# Patient Record
Sex: FEMALE | Race: WHITE | NOT HISPANIC OR LATINO | Employment: FULL TIME | URBAN - METROPOLITAN AREA
[De-identification: names, ages, dates, MRNs, and addresses within clinical notes are randomized per-mention and may not be internally consistent; named-entity substitution may affect disease eponyms.]

---

## 2022-08-24 RX ORDER — HYDROXYZINE HYDROCHLORIDE 10 MG/1
10 TABLET, FILM COATED ORAL NIGHTLY PRN
COMMUNITY

## 2022-08-24 RX ORDER — NORGESTIMATE AND ETHINYL ESTRADIOL 0.25-0.035
1 KIT ORAL DAILY
COMMUNITY

## 2022-08-24 RX ORDER — CHLORAL HYDRATE 500 MG
CAPSULE ORAL
COMMUNITY

## 2022-08-24 RX ORDER — ESCITALOPRAM OXALATE 10 MG/1
10 TABLET ORAL DAILY
COMMUNITY

## 2022-08-24 RX ORDER — HYDROCHLOROTHIAZIDE 25 MG/1
50 TABLET ORAL DAILY
COMMUNITY
End: 2022-10-05 | Stop reason: DRUGHIGH

## 2022-08-24 RX ORDER — BUSPIRONE HYDROCHLORIDE 15 MG/1
7.5 TABLET ORAL 2 TIMES DAILY
COMMUNITY

## 2022-08-25 ENCOUNTER — OFFICE VISIT (OUTPATIENT)
Dept: BEHAVIORAL HEALTH | Facility: CLINIC | Age: 27
End: 2022-08-25

## 2022-08-25 ENCOUNTER — OFFICE VISIT (OUTPATIENT)
Dept: BARIATRICS/WEIGHT MGMT | Facility: CLINIC | Age: 27
End: 2022-08-25

## 2022-08-25 VITALS
HEART RATE: 93 BPM | BODY MASS INDEX: 39.27 KG/M2 | SYSTOLIC BLOOD PRESSURE: 136 MMHG | DIASTOLIC BLOOD PRESSURE: 84 MMHG | OXYGEN SATURATION: 97 % | WEIGHT: 200 LBS | TEMPERATURE: 97.7 F | RESPIRATION RATE: 18 BRPM | HEIGHT: 60 IN

## 2022-08-25 DIAGNOSIS — R06.09 DYSPNEA ON EXERTION: Primary | ICD-10-CM

## 2022-08-25 DIAGNOSIS — F41.9 ANXIETY: ICD-10-CM

## 2022-08-25 DIAGNOSIS — Z71.89 ENCOUNTER FOR PSYCHOLOGICAL ASSESSMENT PRIOR TO BARIATRIC SURGERY: Primary | ICD-10-CM

## 2022-08-25 DIAGNOSIS — E16.2 HYPOGLYCEMIA: ICD-10-CM

## 2022-08-25 DIAGNOSIS — F32.A ANXIETY AND DEPRESSION: ICD-10-CM

## 2022-08-25 DIAGNOSIS — F41.0 PANIC ATTACKS: ICD-10-CM

## 2022-08-25 DIAGNOSIS — F41.9 ANXIETY AND DEPRESSION: ICD-10-CM

## 2022-08-25 DIAGNOSIS — M19.90 ARTHRITIS: ICD-10-CM

## 2022-08-25 DIAGNOSIS — E66.9 OBESITY, CLASS II, BMI 35-39.9: ICD-10-CM

## 2022-08-25 DIAGNOSIS — F32.A DEPRESSION, UNSPECIFIED DEPRESSION TYPE: ICD-10-CM

## 2022-08-25 DIAGNOSIS — R60.0 LOWER EXTREMITY EDEMA: ICD-10-CM

## 2022-08-25 DIAGNOSIS — E78.5 HYPERLIPIDEMIA, UNSPECIFIED HYPERLIPIDEMIA TYPE: ICD-10-CM

## 2022-08-25 DIAGNOSIS — I10 HYPERTENSION, UNSPECIFIED TYPE: ICD-10-CM

## 2022-08-25 DIAGNOSIS — G43.909 MIGRAINE WITHOUT STATUS MIGRAINOSUS, NOT INTRACTABLE, UNSPECIFIED MIGRAINE TYPE: ICD-10-CM

## 2022-08-25 DIAGNOSIS — R12 HEARTBURN: ICD-10-CM

## 2022-08-25 DIAGNOSIS — E66.9 OBESITY (BMI 30-39.9): ICD-10-CM

## 2022-08-25 PROCEDURE — 90791 PSYCH DIAGNOSTIC EVALUATION: CPT | Performed by: PSYCHOLOGIST

## 2022-08-25 PROCEDURE — 99204 OFFICE O/P NEW MOD 45 MIN: CPT | Performed by: PHYSICIAN ASSISTANT

## 2022-08-25 NOTE — PROGRESS NOTES
[facility] BARIATRIC SURGERY  [address]  [address]  Formerly [facility] [id number] [phone]      Patient  Name:  Sujatha Irvin  :  1995        Chief Complaint:  weight gain; unable to maintain weight loss    History of Present Illness:  Sujatha Irvin is a 27 y.o. female who presents today for evaluation, education and consultation regarding bariatric and metabolic surgery. The patient is interested in sleeve gastrectomy with Dr. Butt.     Sujatha has been overweight for at least 11 years, has been 35 pounds or more overweight for at least 11 years, has been 100 pounds or more overweight for 7 or more years and started dieting at age 27.      Previous diet attempts include: High Protein, Low Carbohydrate, Low Fat and Calorie Counting; None; None.  The most weight Sujatha lost was 0.5 pounds on watching calories but was only able to maintain that weight loss for a month.  Her maximum lifetime weight is 201 pounds.    As above, patient has been overweight for many years, with numerous failed dietary/weight loss attempts.  She now has obesity related comorbidities and as such has decided to pursue weight loss surgery.    Patient has a history of hypertension, hyperlipidemia, lower extremity edema, arthritis treated with NSAIDs as needed, anxiety, depression, migraines, episodes of hypoglycemia.    GI: Heartburn treated with Tums as needed, denies prior EGD or known past H. pylori.  Gallbladder is present denies other GI symptoms.    All other past medical, surgical, social and family history have been obtained and discussed as pertinent to bariatric surgery as below.     Pre-Procedure COVID-19 Questionnaire:    1.  Have you previously been tested for COVID-19?    []  No  [x]  Yes    2.  Were you ever positive for COVID-19?    []  No  [x]  Yes    3.  Are you employed in a healthcare setting?    [x]  No  []  Yes    4.  Are you symptomatic for COVID-19 as defined by the CDC (fever,  cough)?  If so, when did symptoms begin?    [x]  No    []  Yes, symptoms began **    5.  Have you been hospitalized for COVID-19?  If so, were you in the ICU?  [x]  No    []  Yes, but not in the ICU    []  Yes, and I was in the ICU    6.  Are you a resident in a congregate (group care setting?)    [x]  No  []  Yes    7.  Are you pregnant?  [x]  No  []  Yes    8.  Are you vaccinated?    [x]  No  []  Yes, but only partially   []  Yes, fully         Past Medical History:   Diagnosis Date   • Anxiety    • Arthritis     NSAIDS prn   • Depression    • Dyspnea on exertion    • Heartburn     tums prn,  no prior EGD or known h pylori   • HLD (hyperlipidemia)    • HTN (hypertension)    • Hypoglycemia    • Lower extremity edema    • Migraines      Past Surgical History:   Procedure Laterality Date   •  SECTION     •  SECTION     • KNEE SURGERY Right    • KNEE SURGERY Right        No Known Allergies    Current Outpatient Medications:   •  busPIRone (BUSPAR) 15 MG tablet, Take 7.5 mg by mouth 2 (Two) Times a Day., Disp: , Rfl:   •  escitalopram (LEXAPRO) 10 MG tablet, Take 10 mg by mouth Daily., Disp: , Rfl:   •  hydroCHLOROthiazide (HYDRODIURIL) 25 MG tablet, Take 50 mg by mouth Daily., Disp: , Rfl:   •  hydrOXYzine (ATARAX) 10 MG tablet, Take 10 mg by mouth Daily., Disp: , Rfl:   •  norgestimate-ethinyl estradiol (ORTHO-CYCLEN) 0.25-35 MG-MCG per tablet, Take 1 tablet by mouth Daily., Disp: , Rfl:   •  Omega-3 Fatty Acids (fish oil) 1000 MG capsule capsule, Take  by mouth Daily With Breakfast., Disp: , Rfl:     Social History     Socioeconomic History   • Marital status: Single   Tobacco Use   • Smoking status: Never Smoker   • Smokeless tobacco: Never Used   Vaping Use   • Vaping Use: Never used   Substance and Sexual Activity   • Alcohol use: Never   • Drug use: Defer     Family History   Problem Relation Age of Onset   • Hypertension Mother    • Stroke Mother 45   • Cancer Mother    • No  Known Problems Father    • Sleep apnea Brother    • Skin cancer Maternal Grandmother    • Lung cancer Maternal Grandfather    • Diabetes Paternal Grandmother    • Cancer Paternal Grandfather        Review of Systems:  Constitutional:  Reports fatigue, weight gain and denies fevers, chills.  HEENT:  denies headache, ear pain or loss of hearing, blurred or double vision, nasal discharge or sore throat.  Cardiovascular:  Reports HTN, HLD, edema and denies CAD, Atrial Fib, hx heart disease, heart murmur, hx MI, chest pain, palpitations, , hx DVT.  Respiratory:  Reports dyspnea on exertion and denies cough , wheezing, sleep apnea, asthma, COPD, hx PE.  Gastrointestinal:  Reports heartburn and denies dysphagia, nausea, vomiting, abdominal pain, IBS, diarrhea, constipation, melena, blood in stool, gallbladder issues, liver disease, hx pancreatitis.  Genitourinary:  Reports none and denies history of  frequent UTI, incontinence, hematuria, dysuria, polyuria, polydipsia, renal insufficiency, renal failure.    Musculoskeletal:  Reports knee OA and denies joint pain, fibromyalgia and autoimmune disease.  Neurological:  Reports migraines and denies numbness /tingling, dizziness, confusion, seizure, stroke.  Psychiatric:  Reports hx depression, hx anxiety and denies bipolar disorder, suicidal ideation, hx suicide attempt, hx self injury, hx substance abuse, eating disorder.  Endocrine:  Reports hypoglycemia and denies PCOS, endometriosis, glucose intolerance, diabetes, thyroid disease, gout.  Hematologic:  Reports none and denies bruising, bleeding disorder, hx anemia, hx blood transfusion.  Skin:  Reports none and denies rashes, hx MRSA.      Physical Exam:  Vital Signs:  Weight: 90.7 kg (200 lb)   Body mass index is 39.72 kg/m².  Temp: 97.7 °F (36.5 °C)   Heart Rate: 93   BP: 136/84     Physical Exam  Vitals reviewed.   Constitutional:       Appearance: She is well-developed. She is obese.   HENT:      Head: Normocephalic.       Comments: mask  Neck:      Thyroid: No thyromegaly.   Cardiovascular:      Rate and Rhythm: Normal rate and regular rhythm.      Heart sounds: Normal heart sounds.   Pulmonary:      Effort: Pulmonary effort is normal. No respiratory distress.      Breath sounds: Normal breath sounds. No wheezing.   Abdominal:      General: Bowel sounds are normal. There is no distension.      Palpations: Abdomen is soft.      Tenderness: There is no abdominal tenderness.      Comments: Low transverse scar   Musculoskeletal:         General: Normal range of motion.      Cervical back: Normal range of motion and neck supple.   Skin:     General: Skin is warm and dry.   Neurological:      Mental Status: She is alert and oriented to person, place, and time.   Psychiatric:         Mood and Affect: Mood normal.         Behavior: Behavior normal.         Thought Content: Thought content normal.         Judgment: Judgment normal.         Patient Active Problem List   Diagnosis   • Dyspnea on exertion   • HLD (hyperlipidemia)   • HTN (hypertension)   • Lower extremity edema   • Heartburn   • Arthritis   • Anxiety   • Depression   • Migraines   • Hypoglycemia       Assessment:    Sujatha Irvin is a 27 y.o. year old female with medically complicated obesity pursuing sleeve gastrectomy.    Weight loss surgery is deemed medically necessary given the following obesity related comorbidities including hypertension, dyslipidemia, osteoarthritis, knee pain, GERD, edema, depression and mental health disease with current Weight: 90.7 kg (200 lb) and Body mass index is 39.72 kg/m²..    Plan:  The consultation plan and program requirements were reviewed with the patient.  The patient has been advised that a letter of medical support must be obtained from her primary care physician or referring provider. A psychological evaluation will be arranged.  A nutritional evaluation will be performed.  The patient was advised to start a high protein and low  carbohydrate diet.  Necessary lifestyle modifications were discussed.  Instructions on how to access AMANDA was given to the patient.  AMANDA is an internet based educational video that explains the surgical procedure chosen and answers basic questions regarding that procedure.     Class 2 Severe Obesity (BMI >=35 and <=39.9). Obesity-related health conditions include the following: assa raffi. Obesity is worsening. BMI is is above average; BMI management plan is completed. We discussed consulting a Bariatric surgeon.        Preoperative testing will include: CBC, CMP, Lipids, TSH, HgA1C, H.Pylori UBT, EKG, CXR and EGD     The risks and benefits of the upper endoscopy were discussed with the patient in detail and all questions were answered.  Possibility of perforation, bleeding, aspiration, and anesthesia reaction were reviewed.  Patient agrees to proceed.      Additional preop clearances required prior to surgery: Cardiology.      The patient has been educated on expected postoperative lifestyle changes, including commitment to high protein diet, vitamin regimen, and exercise program.  They are aware that support groups are encouraged for optimal weight loss results. Patient understands that bariatric surgery is not cosmetic surgery but rather a tool to help make a lifelong commitment to lifestyle changes including diet, exercise, behavior modifications, and healthy habits. The procedure was discussed with the patient and all questions were answered. The importance of avoiding ASA/ NSAIDS/ steroids/ tobacco/ hormones/ immunomodulators perioperatively was discussed.         Cary Ferrell PA-C

## 2022-08-26 DIAGNOSIS — R53.83 FATIGUE, UNSPECIFIED TYPE: Primary | ICD-10-CM

## 2022-08-26 NOTE — PROGRESS NOTES
PROGRESS NOTE    Data:    Sujatha Irvin is a 27 y.o. female who met with the undersigned for a scheduled psychological evaluation from 1:45 - 2:30pm.      Clinical Maneuvering/Intervention:      Chief complaint and history of presenting illness/Problems: struggling with obesity for several years. Despite trying different weight loss plans and diets, the pt reported being unsuccessful in losing weight. A psychological evaluation was conducted in order to assess past and current level of functioning. Areas assessed included, but were not limited to: perception of social support, perception of ability to face and deal with challenges in life (positive functioning), anxiety symptoms, depressive symptoms, perspective on beliefs/belief system, coping skills for stress, intelligence level, addiction issues, etc. Therapeutic rapport was established. Interventions conducted today were geared towards assessing the pt's readiness for weight loss surgery and identifying and psychological contraindications for undergoing such a major life change. Social support was deemed strong (specific to weight loss surgery/weight loss in this manner and in a general sense): boyfriend, psychiatrist, emotional support dog, and friends. Current psychological struggles were described as low to moderate, but included anxiety, depression, and fear that if she does not start losing weight soon that her health will start to decline faster. She has panic attacks as well. Coping skills for distress and related to undergoing a major life change such as weight loss surgery/weight loss were deemed strong and included intelligent, good sense of humor, follows directions well, responsible person, maintains quality relationships with others, taking care of her animals at home (cats, dogs, lizard), and believes in herself that he will be successful with weight loss surgery. The pt endorsed having characteristics of readiness to undergo major life changes  inherent in the journey of weight loss surgery. She could speak to having suffered enough with failed diets and the decision to have weight loss surgery is one she feels good about. She rated herself a 10 out of 10, in terms of perceived psychological readiness for weight loss surgery (with 10 being the most ready possible). The pt expressed gratitude for today's visit.     Past Family and Social History:      History of family mental health problems: father (depression)    Psychosocial history: treatment of psychiatric care in the past (N/A), alcohol/substance abuse treatment in the past (N/A) , alcohol/substance abuse problems (N/A), inpatient psychiatric care (N/A).    Mental Status Exam (MSE):  Hygiene:  good  Dress: normal  Attitude:  cooperative and proactive  Motor Activity: normal  Speech: normal  Mood:   excited  Affect:  congruent  Thought Processes: normal  Thought Content:  normal  Suicidal Thoughts:  not endorsed  Homicidal Thoughts:  not endorsed  Crisis Safety Plan: not needed   Hallucinations:  none      Patient's Support Network Includes:  family, friends      Progress toward goal: there is evidence to suggest that she is taking measures to improve the quality of her life including seeking weight loss surgery.       Functional Status: moderate to high      Prognosis: good with weight loss surgery and psychotropic medication     Evaluation, Diagnoses, and Ability/Capacity to Respond to Treatment:      The pt presented to be struggling with anxiety, depression, panic attacks and obesity (BMI = 39.72, obese). It is likely that weight loss will help her feel less emotional distress and improve her confidence. Results of MSE demonstrated a functional status of moderate to high. Strengths: belief in self that she will be successful with weight loss surgery, etc (see detailed list of coping skills above). Needed for growth (CPT code requirement for Weaknesses): weight loss.      From a psychological  standpoint, the pt presents as a good candidate for bariatric surgery. She is motivated for the surgery, has showed readiness for the lifestyle change in terms of starting to adjust her eating habits, and seems to have appropriate expectations of how to prepare and how to live after surgery in order to lose weight successfully.    Treatment Plan:      Short term goals: Continue psychotropic medication as prescribed and regular visits with her psychiatrist (at least every 2 months). Start improving her health by following up with her bariatric surgeon in order to receive weight loss surgery as soon as feasible/appropriate and demonstrate success with compliance to adhering to the recommended diet. Long term goals: reach a healthy weight and alleviation of depression/anxiety/prevent panic attacks via taking control over her health.    Ally Alcantara, PhD, LP

## 2022-08-27 LAB
25(OH)D3+25(OH)D2 SERPL-MCNC: 17.3 NG/ML (ref 30–100)
ALBUMIN SERPL-MCNC: 4.2 G/DL (ref 3.9–5)
ALBUMIN/GLOB SERPL: 1.6 {RATIO} (ref 1.2–2.2)
ALP SERPL-CCNC: 80 IU/L (ref 44–121)
ALT SERPL-CCNC: 10 IU/L (ref 0–32)
AST SERPL-CCNC: 11 IU/L (ref 0–40)
BASOPHILS # BLD AUTO: 0 X10E3/UL (ref 0–0.2)
BASOPHILS NFR BLD AUTO: 1 %
BILIRUB SERPL-MCNC: <0.2 MG/DL (ref 0–1.2)
BUN SERPL-MCNC: 11 MG/DL (ref 6–20)
BUN/CREAT SERPL: 17 (ref 9–23)
CALCIUM SERPL-MCNC: 9.1 MG/DL (ref 8.7–10.2)
CHLORIDE SERPL-SCNC: 104 MMOL/L (ref 96–106)
CHOLEST SERPL-MCNC: 225 MG/DL (ref 100–199)
CO2 SERPL-SCNC: 22 MMOL/L (ref 20–29)
CREAT SERPL-MCNC: 0.64 MG/DL (ref 0.57–1)
EGFRCR-CYS SERPLBLD CKD-EPI 2021: 124 ML/MIN/1.73
EOSINOPHIL # BLD AUTO: 0.1 X10E3/UL (ref 0–0.4)
EOSINOPHIL NFR BLD AUTO: 1 %
ERYTHROCYTE [DISTWIDTH] IN BLOOD BY AUTOMATED COUNT: 11.7 % (ref 11.7–15.4)
FERRITIN SERPL-MCNC: 44 NG/ML (ref 15–150)
FOLATE SERPL-MCNC: 14.1 NG/ML
GLOBULIN SER CALC-MCNC: 2.7 G/DL (ref 1.5–4.5)
GLUCOSE SERPL-MCNC: 96 MG/DL (ref 65–99)
HBA1C MFR BLD: 5.2 % (ref 4.8–5.6)
HCT VFR BLD AUTO: 45.1 % (ref 34–46.6)
HDLC SERPL-MCNC: 40 MG/DL
HGB BLD-MCNC: 14.6 G/DL (ref 11.1–15.9)
IMM GRANULOCYTES # BLD AUTO: 0 X10E3/UL (ref 0–0.1)
IMM GRANULOCYTES NFR BLD AUTO: 1 %
IRON SERPL-MCNC: 79 UG/DL (ref 27–159)
LDLC SERPL CALC-MCNC: 120 MG/DL (ref 0–99)
LYMPHOCYTES # BLD AUTO: 2.1 X10E3/UL (ref 0.7–3.1)
LYMPHOCYTES NFR BLD AUTO: 24 %
MCH RBC QN AUTO: 29.3 PG (ref 26.6–33)
MCHC RBC AUTO-ENTMCNC: 32.4 G/DL (ref 31.5–35.7)
MCV RBC AUTO: 91 FL (ref 79–97)
MONOCYTES # BLD AUTO: 0.7 X10E3/UL (ref 0.1–0.9)
MONOCYTES NFR BLD AUTO: 8 %
NEUTROPHILS # BLD AUTO: 5.7 X10E3/UL (ref 1.4–7)
NEUTROPHILS NFR BLD AUTO: 65 %
PLATELET # BLD AUTO: 378 X10E3/UL (ref 150–450)
POTASSIUM SERPL-SCNC: 4.6 MMOL/L (ref 3.5–5.2)
PROT SERPL-MCNC: 6.9 G/DL (ref 6–8.5)
RBC # BLD AUTO: 4.98 X10E6/UL (ref 3.77–5.28)
SODIUM SERPL-SCNC: 140 MMOL/L (ref 134–144)
TRIGL SERPL-MCNC: 369 MG/DL (ref 0–149)
TSH SERPL DL<=0.005 MIU/L-ACNC: 3.29 UIU/ML (ref 0.45–4.5)
UREA BREATH TEST QL: NEGATIVE
VIT B12 SERPL-MCNC: 299 PG/ML (ref 232–1245)
VLDLC SERPL CALC-MCNC: 65 MG/DL (ref 5–40)
WBC # BLD AUTO: 8.7 X10E3/UL (ref 3.4–10.8)

## 2022-09-06 ENCOUNTER — DOCUMENTATION (OUTPATIENT)
Dept: BARIATRICS/WEIGHT MGMT | Facility: CLINIC | Age: 27
End: 2022-09-06

## 2022-09-06 NOTE — PROGRESS NOTES
"Weight Loss Surgery  Presurgical Nutrition Assessment     Sujatha Irvin  2022  [id number]  [id number]  1995   female    Surgery desired: Sleeve Gastrectomy    Height: 151.1 cm (59.5\")  Weight: 90.7 kg (200 #)  BMI: 39.72    Past Medical History:   Diagnosis Date   • Anxiety    • Arthritis     NSAIDS prn   • Depression    • Dyspnea on exertion    • Heartburn     tums prn,  no prior EGD or known h pylori   • HLD (hyperlipidemia)    • HTN (hypertension)    • Hypoglycemia    • Lower extremity edema    • Migraines      Past Surgical History:   Procedure Laterality Date   •  SECTION     •  SECTION     • KNEE SURGERY Right    • KNEE SURGERY Right      No Known Allergies    Current Outpatient Medications:   •  busPIRone (BUSPAR) 15 MG tablet, Take 7.5 mg by mouth 2 (Two) Times a Day., Disp: , Rfl:   •  Cholecalciferol 1.25 MG (40363 UT) tablet, Take 1 tablet by mouth Every 7 (Seven) Days for 12 doses., Disp: 12 tablet, Rfl: 0  •  escitalopram (LEXAPRO) 10 MG tablet, Take 10 mg by mouth Daily., Disp: , Rfl:   •  hydroCHLOROthiazide (HYDRODIURIL) 25 MG tablet, Take 50 mg by mouth Daily., Disp: , Rfl:   •  hydrOXYzine (ATARAX) 10 MG tablet, Take 10 mg by mouth Daily., Disp: , Rfl:   •  norgestimate-ethinyl estradiol (ORTHO-CYCLEN) 0.25-35 MG-MCG per tablet, Take 1 tablet by mouth Daily., Disp: , Rfl:   •  Omega-3 Fatty Acids (fish oil) 1000 MG capsule capsule, Take  by mouth Daily With Breakfast., Disp: , Rfl:       Assessment of Caloric needs    Estimated Current energy needs:  1555 kcal    Estimated calories for weight loss:  1200 kcal    IBW (Pounds):  125 #      Excess body weight (Pounds):  75 #       Nutrition Recall:  Example of Usual 24 hour intake:      Breakfast: only occasionally eats breakfast at an earlier hour, usually mid-morning if she does eat this meal.    Lunch: @ 11 am - 1/3 pound burger with 1/2 cup fries and 1 cup regular Sprite.     Dinner: @ 5 pm - an 8 oz " steak with 1 cup potatoes and 2 cups sweet tea.     Snacks: none recorded or stated    Beverages of Choice: regular Sprite and sweet tea    Food Allergies or Intolerances: none stated or recorded          Exercise: no set exercise or activity program at this time.        Assessment of Nutritional Adequacy, Excessive Intake or Deficiencies: Total amount of protein to meet needs approached with larger servings of meat at each of two meals.  Diet is high in simple processed carbohydrate, namely potatoes - fried and mashed. Also, due to sweet beverage intake.          Education    Provided Nutrition Guidelines for Bariatric and Metabolic Surgery   1. Reviewed guidelines for higher protein, limited carbohydrate diet to promote weight loss.  Encouraged patient to incorporate these principles of healthy eating    2. Encouraged patient to choose an acceptable protein supplement beverage for the post-surgery liquid diet.  Provided product guidelines and examples.    3. Explained importance of goal setting to help in changing eating behaviors that are not conducive to weight loss.  Specific macronutrient goals as below.   4. Provided follow-up options for support, including contact information for dietitians here.   Web-based support information and apps for smart phones and computers given.        Nutrition Goals   Protein goal:  grams per day in three regular balanced meals and two to three high protein snacks each day, to ensure desired weight loss.   Carbohydrate goal:  100-140 grams per day  Beverage goal: Appropriate non-carbonated beverage intake. Patient will wean herself off of all soda and any other sweetened beverages eg sweet tea.     Exercise Goals  1. Continue current exercise routine, adding 15-30 minutes of activity per day as tolerated and per medical advice.   2. Start activity plan per medical advice if not currently exercising.     Recommend that team proceed with surgery and follow per protocol.    Ligia Angulo, ELENA  09/06/2022  16:30 EDT

## 2022-09-07 ENCOUNTER — HOSPITAL ENCOUNTER (OUTPATIENT)
Dept: RESPIRATORY THERAPY | Facility: HOSPITAL | Age: 27
Discharge: HOME | End: 2022-09-07

## 2022-09-07 ENCOUNTER — TRANSCRIBE ORDERS (OUTPATIENT)
Dept: BARIATRICS/WEIGHT MGMT | Facility: CLINIC | Age: 27
End: 2022-09-07

## 2022-09-07 DIAGNOSIS — R53.83 FATIGUE, UNSPECIFIED TYPE: ICD-10-CM

## 2022-09-07 DIAGNOSIS — R53.83 FATIGUE, UNSPECIFIED TYPE: Primary | ICD-10-CM

## 2022-09-07 LAB
A-A DO2: 12 MMHG (ref 0–300)
ARTERIAL PATENCY WRIST A: ABNORMAL
ATMOSPHERIC PRESS: 729 MMHG
BASE EXCESS BLDA CALC-SCNC: -0.4 MMOL/L (ref 0–2)
BDY SITE: ABNORMAL
BODY TEMPERATURE: 0 C
CO2 BLDA-SCNC: 24.8 MMOL/L (ref 22–33)
COHGB MFR BLD: 1.2 % (ref 0–5)
HCO3 BLDA-SCNC: 23.7 MMOL/L (ref 20–26)
HCT VFR BLD CALC: 46.1 % (ref 38–51)
HGB BLDA-MCNC: 15.1 G/DL (ref 13.5–17.5)
INHALED O2 CONCENTRATION: 21 %
Lab: ABNORMAL
METHGB BLD QL: 0.2 % (ref 0–3)
MODALITY: ABNORMAL
NOTE: ABNORMAL
OXYHGB MFR BLDV: 96.7 % (ref 94–99)
PCO2 BLDA: 36.7 MM HG (ref 35–45)
PCO2 TEMP ADJ BLD: ABNORMAL MM[HG]
PH BLDA: 7.42 PH UNITS (ref 7.35–7.45)
PH, TEMP CORRECTED: ABNORMAL
PO2 BLDA: 89.7 MM HG (ref 83–108)
PO2 TEMP ADJ BLD: ABNORMAL MM[HG]
SAO2 % BLDCOA: 98.1 % (ref 94–99)
VENTILATOR MODE: ABNORMAL

## 2022-09-07 PROCEDURE — 94726 PLETHYSMOGRAPHY LUNG VOLUMES: CPT

## 2022-09-07 PROCEDURE — 83050 HGB METHEMOGLOBIN QUAN: CPT

## 2022-09-07 PROCEDURE — 94726 PLETHYSMOGRAPHY LUNG VOLUMES: CPT | Performed by: INTERNAL MEDICINE

## 2022-09-07 PROCEDURE — 94060 EVALUATION OF WHEEZING: CPT | Performed by: INTERNAL MEDICINE

## 2022-09-07 PROCEDURE — 82375 ASSAY CARBOXYHB QUANT: CPT

## 2022-09-07 PROCEDURE — 94640 AIRWAY INHALATION TREATMENT: CPT

## 2022-09-07 PROCEDURE — 94729 DIFFUSING CAPACITY: CPT

## 2022-09-07 PROCEDURE — 36600 WITHDRAWAL OF ARTERIAL BLOOD: CPT

## 2022-09-07 PROCEDURE — 94060 EVALUATION OF WHEEZING: CPT

## 2022-09-07 PROCEDURE — 94729 DIFFUSING CAPACITY: CPT | Performed by: INTERNAL MEDICINE

## 2022-09-07 PROCEDURE — 82805 BLOOD GASES W/O2 SATURATION: CPT

## 2022-09-07 RX ORDER — ALBUTEROL SULFATE 2.5 MG/3ML
2.5 SOLUTION RESPIRATORY (INHALATION) ONCE
Status: COMPLETED | OUTPATIENT
Start: 2022-09-07 | End: 2022-09-07

## 2022-09-07 RX ADMIN — ALBUTEROL SULFATE 2.5 MG: 2.5 SOLUTION RESPIRATORY (INHALATION) at 10:17

## 2022-09-19 ENCOUNTER — OUTSIDE FACILITY SERVICE (OUTPATIENT)
Dept: BARIATRICS/WEIGHT MGMT | Facility: CLINIC | Age: 27
End: 2022-09-19

## 2022-09-19 ENCOUNTER — LAB REQUISITION (OUTPATIENT)
Dept: LAB | Facility: HOSPITAL | Age: 27
End: 2022-09-19

## 2022-09-19 DIAGNOSIS — R12 HEARTBURN: ICD-10-CM

## 2022-09-19 PROCEDURE — 43239 EGD BIOPSY SINGLE/MULTIPLE: CPT | Performed by: SURGERY

## 2022-09-19 PROCEDURE — 88305 TISSUE EXAM BY PATHOLOGIST: CPT | Performed by: SURGERY

## 2022-09-20 LAB
CYTO UR: NORMAL
LAB AP CASE REPORT: NORMAL
LAB AP CLINICAL INFORMATION: NORMAL
PATH REPORT.FINAL DX SPEC: NORMAL
PATH REPORT.GROSS SPEC: NORMAL

## 2022-10-05 ENCOUNTER — OFFICE VISIT (OUTPATIENT)
Dept: CARDIOLOGY | Facility: CLINIC | Age: 27
End: 2022-10-05

## 2022-10-05 VITALS
WEIGHT: 204.4 LBS | HEART RATE: 83 BPM | SYSTOLIC BLOOD PRESSURE: 128 MMHG | OXYGEN SATURATION: 98 % | DIASTOLIC BLOOD PRESSURE: 90 MMHG | BODY MASS INDEX: 41.2 KG/M2 | HEIGHT: 59 IN

## 2022-10-05 DIAGNOSIS — G47.33 OSA (OBSTRUCTIVE SLEEP APNEA): ICD-10-CM

## 2022-10-05 DIAGNOSIS — R06.09 DYSPNEA ON EXERTION: ICD-10-CM

## 2022-10-05 DIAGNOSIS — E78.2 MIXED HYPERLIPIDEMIA: ICD-10-CM

## 2022-10-05 DIAGNOSIS — Z01.810 PRE-OPERATIVE CARDIOVASCULAR EXAMINATION: Primary | ICD-10-CM

## 2022-10-05 DIAGNOSIS — R60.0 LOWER EXTREMITY EDEMA: ICD-10-CM

## 2022-10-05 DIAGNOSIS — I10 HYPERTENSION, UNSPECIFIED TYPE: ICD-10-CM

## 2022-10-05 PROCEDURE — 99204 OFFICE O/P NEW MOD 45 MIN: CPT | Performed by: SPECIALIST

## 2022-10-05 PROCEDURE — 93000 ELECTROCARDIOGRAM COMPLETE: CPT | Performed by: SPECIALIST

## 2022-10-05 RX ORDER — HYDROCHLOROTHIAZIDE 50 MG/1
1 TABLET ORAL EVERY MORNING
COMMUNITY
Start: 2022-07-14

## 2022-10-05 RX ORDER — IBUPROFEN 800 MG/1
TABLET ORAL
COMMUNITY
Start: 2022-09-08

## 2022-10-05 RX ORDER — TIZANIDINE 4 MG/1
4 TABLET ORAL 3 TIMES DAILY PRN
COMMUNITY
Start: 2022-09-08

## 2022-10-05 NOTE — PROGRESS NOTES
Subjective   Initial consultation, preoperative cardiac risk assessment for bariatric surgery  Sujatha Irvin is a 27 y.o. female who presents to day for Establish Care (Here for eval. ), Hypertension, Hyperlipidemia, Shortness of Breath, and Leg Swelling.    CHIEF COMPLIANT  Chief Complaint   Patient presents with   • Establish Care     Here for eval.    • Hypertension   • Hyperlipidemia   • Shortness of Breath   • Leg Swelling     Problem List:    1. BROWN  2. HTN  3. Hyperlipidemia  4. Migraines    Problem List Items Addressed This Visit        Cardiac and Vasculature    Dyspnea on exertion    Relevant Orders    ECG 12 Lead    Adult Transthoracic Echo Complete w/ Color, Spectral and Contrast if necessary per protocol    HLD (hyperlipidemia)    HTN (hypertension)    Relevant Medications    hydroCHLOROthiazide (HYDRODIURIL) 50 MG tablet    Other Relevant Orders    ECG 12 Lead    Pre-operative cardiovascular examination - Primary       Sleep    EMILY (obstructive sleep apnea)    Relevant Orders    Home Sleep Study       Symptoms and Signs    Lower extremity edema          HPI  She is here today for preoperative risk assessment for bariatric surgery she is fairly active she said she gets along with some shortness of breath if she climbs hills or going fast with intermittent lower extremity edema but no chest pain no palpitations no dizziness she never had any syncope she never used tobacco in the past she never smoked she does have history of hypertension and she has been taking hydrochlorothiazide with improvement of her blood pressure but still not optimally controlled she also has been fatigued and she snores quite a bit at night no history of diabetes she was told in the past that she has hypoglycemia she does have hyperlipidemia and she takes fish oil for that family history wise her mother had some kind of irregular heartbeat and one of her uncles had heart                    PRIOR MEDS  Current Outpatient  Medications on File Prior to Visit   Medication Sig Dispense Refill   • busPIRone (BUSPAR) 15 MG tablet Take 7.5 mg by mouth 2 (Two) Times a Day.     • Cholecalciferol 1.25 MG (38648 UT) tablet Take 1 tablet by mouth Every 7 (Seven) Days for 12 doses. 12 tablet 0   • escitalopram (LEXAPRO) 10 MG tablet Take 10 mg by mouth Daily.     • hydroCHLOROthiazide (HYDRODIURIL) 50 MG tablet Take 1 tablet by mouth Every Morning.     • hydrOXYzine (ATARAX) 10 MG tablet Take 10 mg by mouth At Night As Needed.     • ibuprofen (ADVIL,MOTRIN) 800 MG tablet TAKE 1 TABLET BY MOUTH THREE TIMES DAILY AS NEEDED WITH FOOD OR MILK     • norgestimate-ethinyl estradiol (ORTHO-CYCLEN) 0.25-35 MG-MCG per tablet Take 1 tablet by mouth Daily.     • Omega-3 Fatty Acids (fish oil) 1000 MG capsule capsule Take  by mouth Daily With Breakfast.     • tiZANidine (ZANAFLEX) 4 MG tablet Take 4 mg by mouth 3 (Three) Times a Day As Needed.     • [DISCONTINUED] hydroCHLOROthiazide (HYDRODIURIL) 25 MG tablet Take 50 mg by mouth Daily.       No current facility-administered medications on file prior to visit.       ALLERGIES  Patient has no known allergies.    HISTORY  Past Medical History:   Diagnosis Date   • Anxiety    • Arthritis     NSAIDS prn   • Depression    • Dyspnea on exertion    • Heartburn     tums prn,  no prior EGD or known h pylori   • HLD (hyperlipidemia)    • HTN (hypertension)    • Hypoglycemia    • Lower extremity edema    • Migraines        Social History     Socioeconomic History   • Marital status: Single   Tobacco Use   • Smoking status: Never Smoker   • Smokeless tobacco: Never Used   Vaping Use   • Vaping Use: Never used   Substance and Sexual Activity   • Alcohol use: Never   • Drug use: Defer       Family History   Problem Relation Age of Onset   • Hypertension Mother    • Stroke Mother 45   • Cancer Mother    • No Known Problems Father    • Sleep apnea Brother    • Skin cancer Maternal Grandmother    • Lung cancer Maternal  "Grandfather    • Diabetes Paternal Grandmother    • Cancer Paternal Grandfather        Review of Systems   Constitutional: Positive for fatigue.   Eyes: Positive for visual disturbance.   Respiratory: Positive for shortness of breath.    Cardiovascular: Positive for leg swelling. Negative for chest pain and palpitations.   Gastrointestinal: Negative.    Endocrine: Negative.    Genitourinary: Negative.    Musculoskeletal: Positive for arthralgias and myalgias.   Skin: Negative.    Allergic/Immunologic: Negative.    Neurological: Positive for dizziness, syncope, light-headedness and headaches (HX migraines).   Hematological: Negative.    Psychiatric/Behavioral: Negative.        Objective     VITALS: /90 (BP Location: Left arm, Patient Position: Sitting)   Pulse 83   Ht 151.1 cm (59.49\")   Wt 92.7 kg (204 lb 6.4 oz)   SpO2 98%   BMI 40.61 kg/m²     LABS:   Lab Results (most recent)     None          IMAGING:   No Images in the past 120 days found..    EXAM:  Physical Exam  Vitals reviewed.   Constitutional:       Appearance: She is well-developed.   HENT:      Head: Normocephalic and atraumatic.   Eyes:      Pupils: Pupils are equal, round, and reactive to light.   Neck:      Thyroid: No thyromegaly.      Vascular: No JVD.   Cardiovascular:      Rate and Rhythm: Normal rate and regular rhythm.      Heart sounds: Normal heart sounds. No murmur heard.    No friction rub. No gallop.   Pulmonary:      Effort: Pulmonary effort is normal. No respiratory distress.      Breath sounds: Normal breath sounds. No stridor. No wheezing or rales.   Chest:      Chest wall: No tenderness.   Musculoskeletal:         General: No tenderness or deformity.      Cervical back: Neck supple.   Skin:     General: Skin is warm and dry.   Neurological:      Mental Status: She is alert and oriented to person, place, and time.      Cranial Nerves: No cranial nerve deficit.      Coordination: Coordination normal.         Procedure     ECG " 12 Lead    Date/Time: 10/5/2022 3:52 PM  Performed by: Chuck Brady MD  Authorized by: Chuck Brady MD   Comparison: not compared with previous ECG   Previous ECG: no previous ECG available          EKG: Normal sinus rhythm with sinus arrhythmia otherwise unremarkable EKG, no previous EKGs available for comparison       Assessment & Plan     Diagnoses and all orders for this visit:    1. Pre-operative cardiovascular examination (Primary)    2. Dyspnea on exertion  -     ECG 12 Lead  -     Adult Transthoracic Echo Complete w/ Color, Spectral and Contrast if necessary per protocol; Future    3. Mixed hyperlipidemia    4. Hypertension, unspecified type  -     ECG 12 Lead    5. Lower extremity edema    6. EMILY (obstructive sleep apnea)  -     Home Sleep Study; Future    1.  She is having some occasional edema and shortness of breath some going to get an echocardiogram to assess her cardiac function wall motion and valve morphology  2.  Her blood pressure is mildly elevated with diastolic measurements will monitor for now  3.  I reviewed her labs with significantly elevated triglycerides mild elevated LDL she is on fish oil I think this is not sufficient she may need to get something a little bit more powerful however she is going to have bariatric surgery soon so we will monitor for that  4.  Her symptoms are consistent with sleep apnea I will arrange for home sleep study      Return in about 4 weeks (around 11/2/2022).                   MEDS ORDERED DURING VISIT:  No orders of the defined types were placed in this encounter.      As always, Donna Fajardo, ROSALIA  I appreciate very much the opportunity to participate in the cardiovascular care of your patients. Please do not hesitate to call me with any questions with regards to Sujatha Irvin evaluation and management.         This document has been electronically signed by Chuck Brady MD  October 5, 2022 16:08 EDT

## 2023-04-12 DIAGNOSIS — R06.09 DYSPNEA ON EXERTION: ICD-10-CM

## 2023-04-12 DIAGNOSIS — E66.9 OBESITY, CLASS II, BMI 35-39.9: ICD-10-CM

## 2023-10-11 ENCOUNTER — HOSPITAL ENCOUNTER (OUTPATIENT)
Dept: ULTRASOUND IMAGING | Facility: HOSPITAL | Age: 28
Discharge: HOME | End: 2023-10-11
Admitting: NURSE PRACTITIONER
Payer: COMMERCIAL

## 2023-10-11 DIAGNOSIS — N64.4 BREAST PAIN: ICD-10-CM

## 2023-10-11 PROCEDURE — 76642 ULTRASOUND BREAST LIMITED: CPT

## 2025-04-05 ENCOUNTER — HOSPITAL ENCOUNTER (EMERGENCY)
Facility: HOSPITAL | Age: 30
Discharge: LEFT BEFORE DISCHARGE | End: 2025-04-05
Payer: COMMERCIAL

## 2025-04-05 VITALS
RESPIRATION RATE: 16 BRPM | HEART RATE: 98 BPM | WEIGHT: 190 LBS | OXYGEN SATURATION: 98 % | SYSTOLIC BLOOD PRESSURE: 120 MMHG | DIASTOLIC BLOOD PRESSURE: 98 MMHG | BODY MASS INDEX: 37.3 KG/M2 | TEMPERATURE: 98.4 F | HEIGHT: 60 IN

## 2025-04-05 PROCEDURE — 99211 OFF/OP EST MAY X REQ PHY/QHP: CPT
